# Patient Record
Sex: FEMALE | Race: BLACK OR AFRICAN AMERICAN | Employment: OTHER | URBAN - METROPOLITAN AREA
[De-identification: names, ages, dates, MRNs, and addresses within clinical notes are randomized per-mention and may not be internally consistent; named-entity substitution may affect disease eponyms.]

---

## 2021-09-14 ENCOUNTER — TRANSCRIBE ORDER (OUTPATIENT)
Dept: SCHEDULING | Age: 86
End: 2021-09-14

## 2021-09-14 DIAGNOSIS — R13.10 DYSPHAGIA: Primary | ICD-10-CM

## 2021-09-21 ENCOUNTER — HOSPITAL ENCOUNTER (OUTPATIENT)
Dept: GENERAL RADIOLOGY | Age: 86
Discharge: HOME | End: 2021-09-21
Attending: FAMILY MEDICINE
Payer: MEDICARE

## 2021-09-21 DIAGNOSIS — R13.10 DYSPHAGIA: ICD-10-CM

## 2021-09-21 PROCEDURE — 74230 X-RAY XM SWLNG FUNCJ C+: CPT

## 2021-09-21 PROCEDURE — 92611 MOTION FLUOROSCOPY/SWALLOW: CPT

## 2021-09-21 NOTE — PROGRESS NOTES
Nik Hubbard  [address] [place] [address] STUDY  Patient: Ladarius Batista (76 y.o. female)  Date: 9/21/2021  Referring Provider:  Dr. Edgard Lofton:   Pt reports no difficulty swallowing. States that, once in a while, she will sneeze or cough after swallowing. OBJECTIVE:   Past Medical History:   Past Medical History:   Diagnosis Date    Cholelithiasis 9/17/2013    Diabetes ([address]     Dyspepsia and other specified disorders of function of stomach     S/P laparoscopic cholecystectomy 10/16/2013     Past Surgical History:   Procedure Laterality Date    HX CYST REMOVAL  1957     Current Dietary Status:  Regular diet/thin liquids  Radiologist: Dr. Kinsey Obregon: Lateral;Fluoro  Patient Position: upright in hausted chair    Trial 1:   Consistency Presented: Thin liquid; Solid   How Presented: Self-fed/presented;Cup/sip;Straw;Successive swallows       Bolus Acceptance: No impairment   Bolus Formation/Control: No impairment:     Propulsion: No impairment   Oral Residue: None   Initiation of Swallow: No impairment   Timing: No impairment   Penetration: During swallow (only with large, sequential sips)   Aspiration/Timing: No evidence of aspiration   Pharyngeal Clearance: No residue                       Decreased Tongue Base Retraction?: No  Laryngeal Elevation: WFL (within functional limits)  Aspiration/Penetration Score: 3 (Penetration/Visible residue-Contrast remains above the folds/cords, but is not cleared)  Pharyngeal Symmetry: Not assessed  Pharyngeal-Esophageal Segment:  (large osteophyte)  Pharyngeal Dysfunction: Vertebral calcification    Oral Phase Severity: No impairment  Pharyngeal Phase Severity:  (normal for age)    ASSESSMENT :  Based on the objective data described above, the patient presents with functional oropharyngeal phases of swallow for age.   Pt only with penetration during the swallow with large, sequential sips of thin liquids, which is considered a normal swallow variance above age [de-identified]. No other aspiration, penetration, nor significant residue. Pt noted to have a large cervical osteophyte present at the level of c5 which could cause intermittent coughing particularly with solids, as food and drink may feel as though they get stuck. Swallow presentation consistent with a presbyphagia and not a dysphagia. Recommend continue diet as tolerated. No further SLP needs or swallow work-up indicated. PLAN/RECOMMENDATIONS :  --regular diet/thin liquids  --general aspiration precautions       COMMUNICATION/EDUCATION:   The above findings and recommendations were faxed to referring provider.      Thank you for this referral.  Nhung Marin, SLP  Time Calculation: 20 mins

## 2023-05-11 RX ORDER — VALSARTAN AND HYDROCHLOROTHIAZIDE 160; 12.5 MG/1; MG/1
1 TABLET, FILM COATED ORAL DAILY
COMMUNITY

## 2023-05-11 RX ORDER — METOPROLOL TARTRATE 50 MG/1
TABLET, FILM COATED ORAL DAILY
COMMUNITY

## 2023-05-11 RX ORDER — OMEPRAZOLE 40 MG/1
40 CAPSULE, DELAYED RELEASE ORAL DAILY
COMMUNITY

## 2023-05-11 RX ORDER — SOLIFENACIN SUCCINATE 10 MG/1
10 TABLET, FILM COATED ORAL DAILY
COMMUNITY